# Patient Record
Sex: MALE | Race: WHITE | Employment: FULL TIME | URBAN - METROPOLITAN AREA
[De-identification: names, ages, dates, MRNs, and addresses within clinical notes are randomized per-mention and may not be internally consistent; named-entity substitution may affect disease eponyms.]

---

## 2020-09-23 ENCOUNTER — APPOINTMENT (OUTPATIENT)
Dept: CT IMAGING | Age: 60
End: 2020-09-23
Attending: EMERGENCY MEDICINE
Payer: COMMERCIAL

## 2020-09-23 ENCOUNTER — APPOINTMENT (OUTPATIENT)
Dept: MRI IMAGING | Age: 60
End: 2020-09-23
Attending: EMERGENCY MEDICINE
Payer: COMMERCIAL

## 2020-09-23 ENCOUNTER — HOSPITAL ENCOUNTER (OUTPATIENT)
Age: 60
Setting detail: OBSERVATION
Discharge: HOME | End: 2020-09-23
Attending: EMERGENCY MEDICINE | Admitting: HOSPITALIST
Payer: COMMERCIAL

## 2020-09-23 VITALS
DIASTOLIC BLOOD PRESSURE: 91 MMHG | HEIGHT: 69 IN | TEMPERATURE: 98.1 F | RESPIRATION RATE: 12 BRPM | SYSTOLIC BLOOD PRESSURE: 114 MMHG | BODY MASS INDEX: 21.48 KG/M2 | OXYGEN SATURATION: 96 % | HEART RATE: 53 BPM | WEIGHT: 145 LBS

## 2020-09-23 DIAGNOSIS — G45.9 TIA (TRANSIENT ISCHEMIC ATTACK): Primary | ICD-10-CM

## 2020-09-23 DIAGNOSIS — R03.0 ELEVATED BLOOD PRESSURE READING: ICD-10-CM

## 2020-09-23 LAB
ALBUMIN SERPL-MCNC: 4.3 G/DL (ref 3.4–5)
ALBUMIN/GLOB SERPL: 1.2 {RATIO} (ref 0.8–1.7)
ALP SERPL-CCNC: 85 U/L (ref 45–117)
ALT SERPL-CCNC: 32 U/L (ref 16–61)
AMPHET UR QL SCN: NEGATIVE
ANION GAP SERPL CALC-SCNC: 1 MMOL/L (ref 3–18)
AST SERPL-CCNC: 23 U/L (ref 10–38)
BARBITURATES UR QL SCN: NEGATIVE
BASOPHILS # BLD: 0.1 K/UL (ref 0–0.1)
BASOPHILS NFR BLD: 1 % (ref 0–2)
BENZODIAZ UR QL: NEGATIVE
BILIRUB SERPL-MCNC: 0.6 MG/DL (ref 0.2–1)
BUN SERPL-MCNC: 15 MG/DL (ref 7–18)
BUN/CREAT SERPL: 17 (ref 12–20)
CALCIUM SERPL-MCNC: 9.2 MG/DL (ref 8.5–10.1)
CANNABINOIDS UR QL SCN: NEGATIVE
CHLORIDE SERPL-SCNC: 106 MMOL/L (ref 100–111)
CK MB CFR SERPL CALC: 1.4 % (ref 0–4)
CK MB SERPL-MCNC: 3.3 NG/ML (ref 5–25)
CK SERPL-CCNC: 229 U/L (ref 39–308)
CO2 SERPL-SCNC: 30 MMOL/L (ref 21–32)
COCAINE UR QL SCN: NEGATIVE
CREAT SERPL-MCNC: 0.9 MG/DL (ref 0.6–1.3)
DIFFERENTIAL METHOD BLD: ABNORMAL
EOSINOPHIL # BLD: 0.1 K/UL (ref 0–0.4)
EOSINOPHIL NFR BLD: 1 % (ref 0–5)
ERYTHROCYTE [DISTWIDTH] IN BLOOD BY AUTOMATED COUNT: 14 % (ref 11.6–14.5)
GLOBULIN SER CALC-MCNC: 3.7 G/DL (ref 2–4)
GLUCOSE SERPL-MCNC: 110 MG/DL (ref 74–99)
HCT VFR BLD AUTO: 51.6 % (ref 36–48)
HDSCOM,HDSCOM: NORMAL
HGB BLD-MCNC: 17.7 G/DL (ref 13–16)
INR PPP: 1 (ref 0.8–1.2)
LYMPHOCYTES # BLD: 2.6 K/UL (ref 0.9–3.6)
LYMPHOCYTES NFR BLD: 31 % (ref 21–52)
MCH RBC QN AUTO: 30.4 PG (ref 24–34)
MCHC RBC AUTO-ENTMCNC: 34.3 G/DL (ref 31–37)
MCV RBC AUTO: 88.7 FL (ref 74–97)
METHADONE UR QL: NEGATIVE
MONOCYTES # BLD: 0.7 K/UL (ref 0.05–1.2)
MONOCYTES NFR BLD: 8 % (ref 3–10)
NEUTS SEG # BLD: 5.1 K/UL (ref 1.8–8)
NEUTS SEG NFR BLD: 59 % (ref 40–73)
OPIATES UR QL: NEGATIVE
PCP UR QL: NEGATIVE
PLATELET # BLD AUTO: 239 K/UL (ref 135–420)
PMV BLD AUTO: 10.5 FL (ref 9.2–11.8)
POTASSIUM SERPL-SCNC: 4.2 MMOL/L (ref 3.5–5.5)
PROT SERPL-MCNC: 8 G/DL (ref 6.4–8.2)
PROTHROMBIN TIME: 13.1 SEC (ref 11.5–15.2)
RBC # BLD AUTO: 5.82 M/UL (ref 4.7–5.5)
SODIUM SERPL-SCNC: 137 MMOL/L (ref 136–145)
TROPONIN I SERPL-MCNC: <0.02 NG/ML (ref 0–0.04)
WBC # BLD AUTO: 8.5 K/UL (ref 4.6–13.2)

## 2020-09-23 PROCEDURE — 80053 COMPREHEN METABOLIC PANEL: CPT

## 2020-09-23 PROCEDURE — 99218 HC RM OBSERVATION: CPT

## 2020-09-23 PROCEDURE — 74011250636 HC RX REV CODE- 250/636: Performed by: EMERGENCY MEDICINE

## 2020-09-23 PROCEDURE — 82550 ASSAY OF CK (CPK): CPT

## 2020-09-23 PROCEDURE — 74011000258 HC RX REV CODE- 258: Performed by: EMERGENCY MEDICINE

## 2020-09-23 PROCEDURE — 74011250637 HC RX REV CODE- 250/637: Performed by: EMERGENCY MEDICINE

## 2020-09-23 PROCEDURE — 90686 IIV4 VACC NO PRSV 0.5 ML IM: CPT | Performed by: HOSPITALIST

## 2020-09-23 PROCEDURE — 85025 COMPLETE CBC W/AUTO DIFF WBC: CPT

## 2020-09-23 PROCEDURE — 70496 CT ANGIOGRAPHY HEAD: CPT

## 2020-09-23 PROCEDURE — 77030021352 HC CBL LD SYS DISP COVD -B

## 2020-09-23 PROCEDURE — 80307 DRUG TEST PRSMV CHEM ANLYZR: CPT

## 2020-09-23 PROCEDURE — 70551 MRI BRAIN STEM W/O DYE: CPT

## 2020-09-23 PROCEDURE — 70450 CT HEAD/BRAIN W/O DYE: CPT

## 2020-09-23 PROCEDURE — 99285 EMERGENCY DEPT VISIT HI MDM: CPT

## 2020-09-23 PROCEDURE — 74011250636 HC RX REV CODE- 250/636: Performed by: HOSPITALIST

## 2020-09-23 PROCEDURE — 74011000636 HC RX REV CODE- 636: Performed by: EMERGENCY MEDICINE

## 2020-09-23 PROCEDURE — 65660000000 HC RM CCU STEPDOWN

## 2020-09-23 PROCEDURE — 2709999900 HC NON-CHARGEABLE SUPPLY

## 2020-09-23 PROCEDURE — 85610 PROTHROMBIN TIME: CPT

## 2020-09-23 PROCEDURE — 93005 ELECTROCARDIOGRAM TRACING: CPT

## 2020-09-23 PROCEDURE — 90471 IMMUNIZATION ADMIN: CPT

## 2020-09-23 RX ORDER — ACETAMINOPHEN 650 MG/1
650 SUPPOSITORY RECTAL
Status: DISCONTINUED | OUTPATIENT
Start: 2020-09-23 | End: 2020-09-23 | Stop reason: HOSPADM

## 2020-09-23 RX ORDER — SODIUM CHLORIDE 0.9 % (FLUSH) 0.9 %
5-40 SYRINGE (ML) INJECTION AS NEEDED
Status: DISCONTINUED | OUTPATIENT
Start: 2020-09-23 | End: 2020-09-23 | Stop reason: HOSPADM

## 2020-09-23 RX ORDER — ALPRAZOLAM 0.5 MG/1
1 TABLET ORAL
COMMUNITY

## 2020-09-23 RX ORDER — SODIUM CHLORIDE 9 MG/ML
100 INJECTION, SOLUTION INTRAVENOUS CONTINUOUS
Status: DISCONTINUED | OUTPATIENT
Start: 2020-09-23 | End: 2020-09-23 | Stop reason: HOSPADM

## 2020-09-23 RX ORDER — ONDANSETRON 2 MG/ML
4 INJECTION INTRAMUSCULAR; INTRAVENOUS
Status: DISCONTINUED | OUTPATIENT
Start: 2020-09-23 | End: 2020-09-23 | Stop reason: HOSPADM

## 2020-09-23 RX ORDER — ACETAMINOPHEN 325 MG/1
650 TABLET ORAL
Status: DISCONTINUED | OUTPATIENT
Start: 2020-09-23 | End: 2020-09-23 | Stop reason: HOSPADM

## 2020-09-23 RX ORDER — PROMETHAZINE HYDROCHLORIDE 25 MG/1
12.5 TABLET ORAL
Status: DISCONTINUED | OUTPATIENT
Start: 2020-09-23 | End: 2020-09-23 | Stop reason: HOSPADM

## 2020-09-23 RX ORDER — POLYETHYLENE GLYCOL 3350 17 G/17G
17 POWDER, FOR SOLUTION ORAL DAILY PRN
Status: DISCONTINUED | OUTPATIENT
Start: 2020-09-23 | End: 2020-09-23 | Stop reason: HOSPADM

## 2020-09-23 RX ORDER — GUAIFENESIN 100 MG/5ML
324 LIQUID (ML) ORAL
Status: COMPLETED | OUTPATIENT
Start: 2020-09-23 | End: 2020-09-23

## 2020-09-23 RX ORDER — SODIUM CHLORIDE 9 MG/ML
100 INJECTION, SOLUTION INTRAVENOUS
Status: COMPLETED | OUTPATIENT
Start: 2020-09-23 | End: 2020-09-23

## 2020-09-23 RX ORDER — CLOPIDOGREL BISULFATE 75 MG/1
300 TABLET ORAL
Status: COMPLETED | OUTPATIENT
Start: 2020-09-23 | End: 2020-09-23

## 2020-09-23 RX ORDER — SODIUM CHLORIDE 0.9 % (FLUSH) 0.9 %
5-40 SYRINGE (ML) INJECTION EVERY 8 HOURS
Status: DISCONTINUED | OUTPATIENT
Start: 2020-09-23 | End: 2020-09-23 | Stop reason: HOSPADM

## 2020-09-23 RX ADMIN — SODIUM CHLORIDE 100 ML/HR: 900 INJECTION, SOLUTION INTRAVENOUS at 12:59

## 2020-09-23 RX ADMIN — ASPIRIN 81 MG CHEWABLE TABLET 324 MG: 81 TABLET CHEWABLE at 12:59

## 2020-09-23 RX ADMIN — SODIUM CHLORIDE 100 ML: 900 INJECTION, SOLUTION INTRAVENOUS at 11:17

## 2020-09-23 RX ADMIN — CLOPIDOGREL BISULFATE 300 MG: 75 TABLET ORAL at 12:56

## 2020-09-23 RX ADMIN — Medication 10 ML: at 14:27

## 2020-09-23 RX ADMIN — INFLUENZA VIRUS VACCINE 0.5 ML: 15; 15; 15; 15 SUSPENSION INTRAMUSCULAR at 18:09

## 2020-09-23 RX ADMIN — IOPAMIDOL 80 ML: 755 INJECTION, SOLUTION INTRAVENOUS at 11:16

## 2020-09-23 NOTE — PROGRESS NOTES
Problem: Patient Education: Go to Patient Education Activity  Goal: Patient/Family Education  9/23/2020 1845 by Desiree Toure RN  Outcome: Progressing Towards Goal  9/23/2020 1448 by Desiree Toure RN  Outcome: Progressing Towards Goal     Problem: TIA/CVA Stroke: 0-24 hours  Goal: Off Pathway (Use only if patient is Off Pathway)  9/23/2020 1845 by Desiree Toure RN  Outcome: Progressing Towards Goal  9/23/2020 1448 by Desiree Toure RN  Outcome: Progressing Towards Goal  Goal: Activity/Safety  9/23/2020 1845 by Desiree Toure RN  Outcome: Progressing Towards Goal  9/23/2020 1448 by Desiree Toure RN  Outcome: Progressing Towards Goal  Goal: Consults, if ordered  9/23/2020 1845 by Desiree Toure RN  Outcome: Progressing Towards Goal  9/23/2020 1448 by Desiree Toure RN  Outcome: Progressing Towards Goal  Goal: Diagnostic Test/Procedures  9/23/2020 1845 by Desiree Toure RN  Outcome: Progressing Towards Goal  9/23/2020 1448 by Desiree Toure RN  Outcome: Progressing Towards Goal  Goal: Nutrition/Diet  9/23/2020 1845 by Desiree Toure RN  Outcome: Progressing Towards Goal  9/23/2020 1448 by Desiree Toure RN  Outcome: Progressing Towards Goal  Goal: Discharge Planning  9/23/2020 1845 by Desiree Toure RN  Outcome: Progressing Towards Goal  9/23/2020 1448 by Desiree Toure RN  Outcome: Progressing Towards Goal  Goal: Medications  9/23/2020 1845 by Desiree Toure RN  Outcome: Progressing Towards Goal  9/23/2020 1448 by Desiree Toure RN  Outcome: Progressing Towards Goal  Goal: Respiratory  9/23/2020 1845 by Desiree Toure RN  Outcome: Progressing Towards Goal  9/23/2020 1448 by Desiree Toure RN  Outcome: Progressing Towards Goal  Goal: Treatments/Interventions/Procedures  9/23/2020 1845 by Desiree Toure RN  Outcome: Progressing Towards Goal  9/23/2020 1448 by Annelise Garcia RN  Outcome: Progressing Towards Goal  Goal: Minimize risk of bleeding post-thrombolytic infusion  9/23/2020 1845 by Annelise Garcia RN  Outcome: Progressing Towards Goal  9/23/2020 1448 by Annelise Garcia RN  Outcome: Progressing Towards Goal  Goal: Monitor for complications post-thrombolytic infusion  9/23/2020 1845 by Annelise Garcia RN  Outcome: Progressing Towards Goal  9/23/2020 1448 by Annelise Garcia RN  Outcome: Progressing Towards Goal  Goal: Psychosocial  9/23/2020 1845 by Annelise Garcia RN  Outcome: Progressing Towards Goal  9/23/2020 1448 by Annelise Garcia RN  Outcome: Progressing Towards Goal  Goal: *Hemodynamically stable  9/23/2020 1845 by Annelise Garcia RN  Outcome: Progressing Towards Goal  9/23/2020 1448 by Annelise Garcia RN  Outcome: Progressing Towards Goal  Goal: *Neurologically stable  Description: Absence of additional neurological deficits    9/23/2020 1845 by Annelise Garcia RN  Outcome: Progressing Towards Goal  9/23/2020 1448 by Annelise Garcia RN  Outcome: Progressing Towards Goal  Goal: *Verbalizes anxiety and depression are reduced or absent  9/23/2020 1845 by Annelise Garcia RN  Outcome: Progressing Towards Goal  9/23/2020 1448 by Annelise Garcia RN  Outcome: Progressing Towards Goal  Goal: *Absence of Signs of Aspiration on Current Diet  9/23/2020 1845 by Annelise Garcia RN  Outcome: Progressing Towards Goal  9/23/2020 1448 by Annelise Garcia RN  Outcome: Progressing Towards Goal  Goal: *Absence of deep venous thrombosis signs and symptoms(Stroke Metric)  9/23/2020 1845 by Annelise Garcia RN  Outcome: Progressing Towards Goal  9/23/2020 1448 by Annelise Garcia RN  Outcome: Progressing Towards Goal  Goal: *Ability to perform ADLs and demonstrates progressive mobility and function  9/23/2020 1845 by Jacinta Stacy RN  Outcome: Progressing Towards Goal  9/23/2020 1448 by Jacinta Stacy RN  Outcome: Progressing Towards Goal  Goal: *Stroke education started(Stroke Metric)  9/23/2020 1845 by Jacinta Stacy RN  Outcome: Progressing Towards Goal  9/23/2020 1448 by Jacinta Stacy RN  Outcome: Progressing Towards Goal  Goal: *Dysphagia screen performed(Stroke Metric)  9/23/2020 1845 by Jacinta Stacy RN  Outcome: Progressing Towards Goal  9/23/2020 1448 by Jacinta Stacy RN  Outcome: Progressing Towards Goal  Goal: *Rehab consulted(Stroke Metric)  9/23/2020 1845 by Jacinta Stacy RN  Outcome: Progressing Towards Goal  9/23/2020 1448 by Jacinta Stacy RN  Outcome: Progressing Towards Goal     Problem: TIA/CVA Stroke: Day 2 Until Discharge  Goal: Off Pathway (Use only if patient is Off Pathway)  9/23/2020 1845 by Jacinta Stacy RN  Outcome: Progressing Towards Goal  9/23/2020 1448 by Jacinta Stacy RN  Outcome: Progressing Towards Goal  Goal: Activity/Safety  9/23/2020 1845 by Jacinta Stacy RN  Outcome: Progressing Towards Goal  9/23/2020 1448 by Jacinta Stacy RN  Outcome: Progressing Towards Goal  Goal: Diagnostic Test/Procedures  9/23/2020 1845 by Jacinta Stacy RN  Outcome: Progressing Towards Goal  9/23/2020 1448 by Jacinta Stacy RN  Outcome: Progressing Towards Goal  Goal: Nutrition/Diet  9/23/2020 1845 by Jacinta Stacy RN  Outcome: Progressing Towards Goal  9/23/2020 1448 by Jacinta Stacy RN  Outcome: Progressing Towards Goal  Goal: Discharge Planning  9/23/2020 1845 by Jacinta Stacy RN  Outcome: Progressing Towards Goal  9/23/2020 1448 by Jacinta Stacy RN  Outcome: Progressing Towards Goal  Goal: Medications  9/23/2020 1845 by Jacinta Stacy RN  Outcome: Progressing Towards Goal  9/23/2020 1448 by Jacinta Stacy RN  Outcome: Progressing Towards Goal  Goal: Respiratory  9/23/2020 1845 by Naman Stevens, RN  Outcome: Progressing Towards Goal  9/23/2020 1448 by Naman Stevens, RN  Outcome: Progressing Towards Goal  Goal: Treatments/Interventions/Procedures  9/23/2020 1845 by Naman Stevens, RN  Outcome: Progressing Towards Goal  9/23/2020 1448 by Naman Stevens, RN  Outcome: Progressing Towards Goal  Goal: Psychosocial  9/23/2020 1845 by Naman Stevens, RN  Outcome: Progressing Towards Goal  9/23/2020 1448 by Naman Stevens, RN  Outcome: Progressing Towards Goal  Goal: *Verbalizes anxiety and depression are reduced or absent  9/23/2020 1845 by Naman Stevens, RN  Outcome: Progressing Towards Goal  9/23/2020 1448 by Naman Stevens, RN  Outcome: Progressing Towards Goal  Goal: *Absence of aspiration  9/23/2020 1845 by Naman Stevens, RN  Outcome: Progressing Towards Goal  9/23/2020 1448 by Naman Stevens, RN  Outcome: Progressing Towards Goal  Goal: *Absence of deep venous thrombosis signs and symptoms(Stroke Metric)  9/23/2020 1845 by Naman Stevens, RN  Outcome: Progressing Towards Goal  9/23/2020 1448 by Naman Stevens, RN  Outcome: Progressing Towards Goal  Goal: *Optimal pain control at patient's stated goal  9/23/2020 1845 by Naman Stevens, RN  Outcome: Progressing Towards Goal  9/23/2020 1448 by Naman Stevens, RN  Outcome: Progressing Towards Goal  Goal: *Tolerating diet  9/23/2020 1845 by Naman Stevens, RN  Outcome: Progressing Towards Goal  9/23/2020 1448 by Naman Stevens, RN  Outcome: Progressing Towards Goal  Goal: *Ability to perform ADLs and demonstrates progressive mobility and function  9/23/2020 1845 by Naman Stevens, RN  Outcome: Progressing Towards Goal  9/23/2020 1448 by Naman Stevens, RN  Outcome: Progressing Towards Goal  Goal: *Stroke education continued(Stroke Metric)  9/23/2020 1845 by Stacey Peterson RN  Outcome: Progressing Towards Goal  9/23/2020 1448 by Stacey Peterson RN  Outcome: Progressing Towards Goal     Problem: Ischemic Stroke: Discharge Outcomes  Goal: *Verbalizes anxiety and depression are reduced or absent  9/23/2020 1845 by Stacey Peterson RN  Outcome: Progressing Towards Goal  9/23/2020 1448 by Stacey Peterson RN  Outcome: Progressing Towards Goal  Goal: *Verbalize understanding of risk factor modification(Stroke Metric)  9/23/2020 1845 by Stacey Peterson RN  Outcome: Progressing Towards Goal  9/23/2020 1448 by Stacey Peterson RN  Outcome: Progressing Towards Goal  Goal: *Hemodynamically stable  9/23/2020 1845 by Stacey Peterson RN  Outcome: Progressing Towards Goal  9/23/2020 1448 by Stacey Peterson RN  Outcome: Progressing Towards Goal  Goal: *Absence of aspiration pneumonia  9/23/2020 1845 by Stacey Peterson RN  Outcome: Progressing Towards Goal  9/23/2020 1448 by Stacey Peterson RN  Outcome: Progressing Towards Goal  Goal: *Aware of needed dietary changes  9/23/2020 1845 by Stacey Peterson RN  Outcome: Progressing Towards Goal  9/23/2020 1448 by Stacey Peterson RN  Outcome: Progressing Towards Goal  Goal: *Verbalize understanding of prescribed medications including anti-coagulants, anti-lipid, and/or anti-platelets(Stroke Metric)  9/23/2020 1845 by Stacey Peterson RN  Outcome: Progressing Towards Goal  9/23/2020 1448 by Stacey Peterson RN  Outcome: Progressing Towards Goal  Goal: *Tolerating diet  9/23/2020 1845 by Stacey Peterson RN  Outcome: Progressing Towards Goal  9/23/2020 1448 by Stacey Peterson RN  Outcome: Progressing Towards Goal  Goal: *Aware of follow-up diagnostics related to anticoagulants  9/23/2020 1845 by Stacey Peterson RN  Outcome: Progressing Towards Goal  9/23/2020 1448 by Stacey Peterson RN  Outcome: Progressing Towards Goal  Goal: *Ability to perform ADLs and demonstrates progressive mobility and function  9/23/2020 1845 by Caroline Rick RN  Outcome: Progressing Towards Goal  9/23/2020 1448 by Caroline Rick RN  Outcome: Progressing Towards Goal  Goal: *Absence of DVT(Stroke Metric)  9/23/2020 1845 by Caroline Rick RN  Outcome: Progressing Towards Goal  9/23/2020 1448 by Caroline Rick RN  Outcome: Progressing Towards Goal  Goal: *Absence of aspiration  9/23/2020 1845 by Caroline Rick RN  Outcome: Progressing Towards Goal  9/23/2020 1448 by Caroline Rick RN  Outcome: Progressing Towards Goal  Goal: *Optimal pain control at patient's stated goal  9/23/2020 1845 by Caroline Rick RN  Outcome: Progressing Towards Goal  9/23/2020 1448 by Caroline Rick RN  Outcome: Progressing Towards Goal  Goal: *Home safety concerns addressed  9/23/2020 1845 by Caroline Rick RN  Outcome: Progressing Towards Goal  9/23/2020 1448 by Caroline Rick RN  Outcome: Progressing Towards Goal  Goal: *Describes available resources and support systems  9/23/2020 1845 by Caroline Rick RN  Outcome: Progressing Towards Goal  9/23/2020 1448 by Caroline Rick RN  Outcome: Progressing Towards Goal  Goal: *Verbalizes understanding of activation of EMS(911) for stroke symptoms(Stroke Metric)  9/23/2020 1845 by Caroline Rick RN  Outcome: Progressing Towards Goal  9/23/2020 1448 by Caroline Rick RN  Outcome: Progressing Towards Goal  Goal: *Understands and describes signs and symptoms to report to providers(Stroke Metric)  9/23/2020 1845 by Caroline Rick, RN  Outcome: Progressing Towards Goal  9/23/2020 1448 by Caroline Rick RN  Outcome: Progressing Towards Goal  Goal: *Neurolgocially stable (absence of additional neurological deficits)  9/23/2020 1845 by Caroline Rick RN  Outcome: Progressing Towards Goal  9/23/2020 1448 by Dorian Mcnamara RN  Outcome: Progressing Towards Goal  Goal: *Verbalizes importance of follow-up with primary care physician(Stroke Metric)  9/23/2020 1845 by Dorian Mcnamara RN  Outcome: Progressing Towards Goal  9/23/2020 1448 by Dorian Mcnamara RN  Outcome: Progressing Towards Goal  Goal: *Smoking cessation discussed,if applicable(Stroke Metric)  9/23/2020 1845 by Dorian Mcnamara RN  Outcome: Progressing Towards Goal  9/23/2020 1448 by Dorian Mcnamara RN  Outcome: Progressing Towards Goal  Goal: *Depression screening completed(Stroke Metric)  9/23/2020 1845 by Dorian Mcnamara RN  Outcome: Progressing Towards Goal  9/23/2020 1448 by Dorian Mcnamara RN  Outcome: Progressing Towards Goal     Problem: Pain  Goal: *Control of Pain  9/23/2020 1845 by Dorian Mcnamara RN  Outcome: Progressing Towards Goal  9/23/2020 1448 by Dorian Mcnamara RN  Outcome: Progressing Towards Goal     Problem: Patient Education: Go to Patient Education Activity  Goal: Patient/Family Education  9/23/2020 1845 by Dorian Mcnamara RN  Outcome: Progressing Towards Goal  9/23/2020 1448 by Dorian Mcnamara RN  Outcome: Progressing Towards Goal     Problem: Falls - Risk of  Goal: *Absence of Falls  Description: Document Nicole Fraga Fall Risk and appropriate interventions in the flowsheet.   9/23/2020 1845 by Dorian Mcnamara RN  Outcome: Progressing Towards Goal  Note: Fall Risk Interventions:                             9/23/2020 1448 by Dorian Mcnamara RN  Outcome: Progressing Towards Goal  Note: Fall Risk Interventions:                                Problem: Patient Education: Go to Patient Education Activity  Goal: Patient/Family Education  9/23/2020 1845 by Dorian Mcnamara RN  Outcome: Progressing Towards Goal  9/23/2020 1448 by Dorian Mcnamara RN  Outcome: Progressing Towards Goal

## 2020-09-23 NOTE — PROGRESS NOTES
Discharge/Transition Planning    Problem: Discharge Planning  Goal: *Discharge to safe environment  Outcome: Resolved/Met    Reason for Admission:   Dizziness                   RUR Score:     7%               Plan for utilizing home health:      n/a    PCP: First and Last name:  Parris   Name of Practice:    Are you a current patient: Yes/No: yes   Approximate date of last visit: last week   Can you participate in a virtual visit with your PCP:                     Current Advanced Directive/Advance Care Plan: none. Educated pt                         Transition of Care Plan:          Interviewed patient. Verified demographics listed on face sheet with patient; all information correct. Patient lives in single family with spouse. Patient independent with ADLs prior to admission. DME prior to admission: none . Discharge plan is home      Patient has designated __spouse______________________ to participate in his/her discharge plan and to receive any needed information. Shahbaz Rajput Spouse   [phone]        Care Management Interventions  PCP Verified by CM:  Yes  Mode of Transport at Discharge: Self  Transition of Care Consult (CM Consult): Discharge Planning  Current Support Network: Lives with Spouse  Confirm Follow Up Transport: Self  Discharge Location  Discharge Placement: Home

## 2020-09-23 NOTE — PROGRESS NOTES

## 2020-09-23 NOTE — ED PROVIDER NOTES
EMERGENCY DEPARTMENT HISTORY AND PHYSICAL EXAM    10:46 AM      Date: 9/23/2020  Patient Name: Nelly Medina    History of Presenting Illness     Chief Complaint   Patient presents with    Dizziness    Ear Fullness    Numbness         History Provided By: Patient      Additional History (Context): Nelly Medina is a 61 y.o. male who presents with plaints of lightheadedness, left facial perioral numbness. Patient states his last time normal was approximately 6:30 AM this morning when he complained of acute onset of dizziness he describes as lightheadedness states that intermittently he has difficulty walking with this he came in today concerned because he had some paresthesias, numbness her left side of his mouth. He denies any changes in his speech, vision, strength. Patient states he has had problems with intermittent dizziness times months it has never been associated with any type of numbness or paresthesias previously. Ernesto Davis PCP: John Paul Conway MD      Current Outpatient Medications   Medication Sig Dispense Refill    ALPRAZolam (Xanax) 0.5 mg tablet Take  by mouth. Past History     Past Medical History:  Past Medical History:   Diagnosis Date    Fibula fracture     Fracture of tibial shaft, right, open     Hypertension     Left inguinal hernia        Past Surgical History:  Past Surgical History:   Procedure Laterality Date    HX APPENDECTOMY      HX HERNIA REPAIR      HX HERNIA REPAIR  05/09/2014    HX ORTHOPAEDIC  01/2011    HX VASECTOMY  07/15/16    [facility]        Family History:  Family History   Family history unknown: Yes       Social History:  Social History     Tobacco Use    Smoking status: Current Every Day Smoker     Packs/day: 1.00     Years: 20.00     Pack years: 20.00     Types: Cigarettes    Smokeless tobacco: Never Used   Substance Use Topics    Alcohol use:  Yes     Alcohol/week: 1.0 standard drinks     Types: 1 Standard drinks or equivalent per week    Drug use: No       Allergies:  No Known Allergies      Review of Systems       Review of Systems   Constitutional: Negative for activity change, appetite change, chills, diaphoresis, fatigue and fever. HENT: Negative for congestion. Respiratory: Negative for chest tightness and shortness of breath. Cardiovascular: Negative for chest pain. Gastrointestinal: Negative for abdominal pain, nausea and vomiting. Genitourinary: Negative for flank pain. Musculoskeletal: Positive for gait problem. Negative for back pain and neck pain. Skin: Negative for rash. Neurological: Positive for dizziness, light-headedness and numbness. Negative for syncope, speech difficulty and weakness. Hematological: Does not bruise/bleed easily. All other systems reviewed and are negative. Physical Exam     Visit Vitals  BP (!) 188/112 (BP 1 Location: Right arm, BP Patient Position: At rest)   Pulse 68   Temp 98 °F (36.7 °C)   Resp 16   Ht 5' 9\" (1.753 m)   Wt 65.8 kg (145 lb)   SpO2 99%   BMI 21.41 kg/m²       Physical Exam  Vitals signs and nursing note reviewed. Constitutional:       General: He is not in acute distress. Appearance: He is well-developed. HENT:      Head: Normocephalic and atraumatic. Eyes:      General: No visual field deficit or scleral icterus. Conjunctiva/sclera: Conjunctivae normal.      Pupils: Pupils are equal, round, and reactive to light. Neck:      Musculoskeletal: Normal range of motion and neck supple. Cardiovascular:      Rate and Rhythm: Normal rate and regular rhythm. Heart sounds: Normal heart sounds. No murmur. Pulmonary:      Effort: Pulmonary effort is normal. No respiratory distress. Breath sounds: Normal breath sounds. Abdominal:      General: Bowel sounds are normal. There is no distension. Palpations: Abdomen is soft. Tenderness: There is no abdominal tenderness. Lymphadenopathy:      Cervical: No cervical adenopathy.    Skin:     General: Skin is warm and dry. Findings: No rash. Neurological:      Mental Status: He is alert and oriented to person, place, and time. GCS: GCS eye subscore is 4. GCS verbal subscore is 5. GCS motor subscore is 6. Cranial Nerves: Cranial nerves are intact. No cranial nerve deficit, dysarthria or facial asymmetry. Sensory: Sensation is intact. Motor: Motor function is intact. Coordination: Coordination normal.      Comments: Able to ambulate in the emergency department without difficulty states he feels like his gait is off   Psychiatric:         Behavior: Behavior normal.           Diagnostic Study Results     Labs -  No results found for this or any previous visit (from the past 12 hour(s)). Radiologic Studies -   CT HEAD WO CONT    (Results Pending)   CTA HEAD NECK W WO CONT    (Results Pending)         Medical Decision Making   I am the first provider for this patient. I reviewed the vital signs, available nursing notes, past medical history, past surgical history, family history and social history. Vital Signs-Reviewed the patient's vital signs.       EKG: Rhythm at a rate of 63 with no acute ST-T wave changes QTC is 444 read by EDMD 1143    Records Reviewed: Nursing Notes and Old Medical Records (Time of Review: 10:46 AM)    ED Course: Progress Notes, Reevaluation, and Consults:      Provider Notes (Medical Decision Making):   MDM  Number of Diagnoses or Management Options  Elevated blood pressure reading:   TIA (transient ischemic attack):   Diagnosis management comments: Active sensation of lightheadedness left facial paresthesia with a sensation of difficulty with gait last time normal over 4 hours ago no significant focal deficit on neuro exam will CT CTA    She is hypertensive in the emergency department improved on return from CT CTA, initial CT negative per radiology CTA shows no large vessel occlusion per tele-neurology seen by Dr. Clarice Tran, recommends aspirin, Plavix, further imaging with MRI and admission for TIA.    12:24 PM  Discussed  with Dr. Roque Mir agrees with admission       Amount and/or Complexity of Data Reviewed  Clinical lab tests: ordered and reviewed  Tests in the radiology section of CPT®: ordered and reviewed  Discuss the patient with other providers: yes    Risk of Complications, Morbidity, and/or Mortality  Presenting problems: high  Diagnostic procedures: high  Management options: high            Critical Care Time:     CRITICAL CARE:  12:24 PM  I have spent 40 minutes of critical care time involved in lab review, consultations with specialist, family decision-making, and documentation. During this entire length of time I was immediately available to the patient. Critical Care: The reason for providing this level of medical care for this critically ill patient was due a critical illness that impaired one or more vital organ systems such that there was a high probability of imminent or life threatening deterioration in the patients condition. This care involved high complexity decision making to assess, manipulate, and support vital system functions, to treat this degreee vital organ system failure and to prevent further life threatening deterioration of the patients condition. Diagnosis     Clinical Impression: No diagnosis found. Disposition: admit    Follow-up Information    None          Patient's Medications   Start Taking    No medications on file   Continue Taking    ALPRAZOLAM (XANAX) 0.5 MG TABLET    Take  by mouth. These Medications have changed    No medications on file   Stop Taking    CYCLOBENZAPRINE (FLEXERIL) 10 MG TABLET    10 mg. DIAZEPAM (VALIUM) 10 MG TABLET    Take 1 Tab by mouth as needed (30 minutes prior to your procedure). NAPROXEN (NAPROSYN) 500 MG TABLET    500 mg. OXYCODONE-ACETAMINOPHEN (PERCOCET) 5-325 MG PER TABLET    Take 1-2 Tabs by mouth every six (6) hours as needed for Pain. Max Daily Amount: 8 Tabs. VARENICLINE (CHANTIX STARTER JULIAN) 0.5 MG (11)- 1 MG (42) DSPK    Day 1-3 0.5mg tab qd Day 4-7 0.5mg bid Weeks 2-4 1mg bid     _______________________________    Please note that this dictation was completed with [employer], the computer voice recognition software. Quite often unanticipated grammatical, syntax, homophones, and other interpretive errors are inadvertently transcribed by the computer software. Please disregard these errors. Please excuse any errors that have escaped final proofreading.

## 2020-09-23 NOTE — ED NOTES
TRANSFER - ED to INPATIENT REPORT:    Verbal report given to Cypantionette on Raisa Anderson  being transferred to 2200 for routine progression of care       Report consisted of patients Situation, Background, Assessment and   Recommendations(SBAR). SBAR report made available to receiving floor on this patient being transferred to [address] [place] [address] (2200)  for routine progression of care       Admitting diagnosis TIA (transient ischemic attack) [G45.9]    Information from the following report(s) SBAR, Kardex, MAR, Recent Results and Cardiac Rhythm NSR was made available to receiving floor. Lines:   Peripheral IV 09/23/20 Right Antecubital (Active)   Site Assessment Clean, dry, & intact 09/23/20 1051   Phlebitis Assessment 0 09/23/20 1051   Infiltration Assessment 0 09/23/20 1051   Dressing Status Clean, dry, & intact 09/23/20 1051   Dressing Type Transparent 09/23/20 1051   Hub Color/Line Status Green;Flushed;Patent 09/23/20 1051   Action Taken Blood drawn 09/23/20 1051   Alcohol Cap Used No 09/23/20 1051        Medication list updated today    Opportunity for questions and clarification was provided.       Patient is oriented to time, place, person and situation Last NIH 1  Patient is  continent and ambulatory without assist     Valuables transported with patient     Patient transported with:   Monitor  Registered Nurse    MAP (Monitor): 106 =Monitored (most recent)

## 2020-09-23 NOTE — PROGRESS NOTES
Discussed workup with the patient at the bedside. He very much wants to go home. MRI is non-concerning for TIA. Discharge with outpatient follow up is appropriate.

## 2020-09-23 NOTE — ROUTINE PROCESS
Patient d/c home; flu vaccine administered. 8943: Patient given discharge instruction including a stroke binder. Patient voiced understanding, wheeled off unit via wheelchair, all patient belongings sent home with patient.

## 2020-09-23 NOTE — ED TRIAGE NOTES
Has been having dizziness intermittently since April. Seen by PCP for same. Went to urgent care when woke up fine. Dizziness started , left mouth numbness .  Continues to be dizziness

## 2020-09-23 NOTE — DISCHARGE INSTRUCTIONS
Patient armband removed and shredded  MyChart Activation    Thank you for requesting access to [facility]. Please follow the instructions below to securely access and download your online medical record. [facility] allows you to send messages to your doctor, view your test results, renew your prescriptions, schedule appointments, and more. How Do I Sign Up? 1. In your internet browser, go to [email or web address]  2. Click on the First Time User? Click Here link in the Sign In box. You will be redirect to the New Member Sign Up page. 3. Enter your [facility] Access Code exactly as it appears below. You will not need to use this code after youve completed the sign-up process. If you do not sign up before the expiration date, you must request a new code. [facility] Access Code: [email or web address]  Expires: 2020 10:46 AM (This is the date your [facility] access code will )    4. Enter the last four digits of your Social Security Number (xxxx) and Date of Birth (mm/dd/yyyy) as indicated and click Submit. You will be taken to the next sign-up page. 5. Create a [facility] ID. This will be your [facility] login ID and cannot be changed, so think of one that is secure and easy to remember. 6. Create a [facility] password. You can change your password at any time. 7. Enter your Password Reset Question and Answer. This can be used at a later time if you forget your password. 8. Enter your e-mail address. You will receive e-mail notification when new information is available in [address]. 9. Click Sign Up. You can now view and download portions of your medical record. 10. Click the Download Summary menu link to download a portable copy of your medical information. Additional Information    If you have questions, please visit the Frequently Asked Questions section of the [facility] website at [email or web address] [email or web address] Remember, [facility] is NOT to be used for urgent needs.  For medical emergencies, dial 911.      PATIENT DISCHARGE INSTRUCTIONS      PATIENT DISCHARGE INSTRUCTIONS    Mackenzie Winter / [id number] : 1960    Admitted 2020 Discharged: 2020       · It is important that you take the medication exactly as they are prescribed. · Keep your medication in the bottles provided by the pharmacist and keep a list of the medication names, dosages, and times to be taken in your wallet. · Do not take other medications without consulting your doctor. What to do at Home    Recommended Diet: Cardiac Diet    Recommended Activity: Activity as tolerated    If you experience any of the following symptoms light headed or dizziness, please follow up with your primary care physician or nearest emergency department  Patient Education        Elevated Blood Pressure: Care Instructions  Your Care Instructions    Blood pressure is a measure of how hard the blood pushes against the walls of your arteries. It's normal for blood pressure to go up and down throughout the day. But if it stays up over time, you have high blood pressure. Two numbers tell you your blood pressure. The first number is the systolic pressure. It shows how hard the blood pushes when your heart is pumping. The second number is the diastolic pressure. It shows how hard the blood pushes between heartbeats, when your heart is relaxed and filling with blood. An ideal blood pressure in adults is less than 120/80 (say \"120 over 80\"). High blood pressure is 140/90 or higher. You have high blood pressure if your top number is 140 or higher or your bottom number is 90 or higher, or both. The main test for high blood pressure is simple, fast, and painless. To diagnose high blood pressure, your doctor will test your blood pressure at different times. After testing your blood pressure, your doctor may ask you to test it again when you are home.   If you are diagnosed with high blood pressure, you can work with your doctor to make a long-term plan to manage it. Follow-up care is a key part of your treatment and safety. Be sure to make and go to all appointments, and call your doctor if you are having problems. It's also a good idea to know your test results and keep a list of the medicines you take. How can you care for yourself at home? · Do not smoke. Smoking increases your risk for heart attack and stroke. If you need help quitting, talk to your doctor about stop-smoking programs and medicines. These can increase your chances of quitting for good. · Stay at a healthy weight. · Try to limit how much sodium you eat to less than 2,300 milligrams (mg) a day. Your doctor may ask you to try to eat less than 1,500 mg a day. · Be physically active. Get at least 30 minutes of exercise on most days of the week. Walking is a good choice. You also may want to do other activities, such as running, swimming, cycling, or playing tennis or team sports. · Avoid or limit alcohol. Talk to your doctor about whether you can drink any alcohol. · Eat plenty of fruits, vegetables, and low-fat dairy products. Eat less saturated and total fats. · Learn how to check your blood pressure at home. When should you call for help? Call your doctor now or seek immediate medical care if:  ? · Your blood pressure is much higher than normal (such as 180/110 or higher). ? · You think high blood pressure is causing symptoms such as:  ¨ Severe headache. ¨ Blurry vision. ? Watch closely for changes in your health, and be sure to contact your doctor if:  ? · You do not get better as expected. Where can you learn more? Go to http://www.gray.com/. Enter V544 in the search box to learn more about \"Elevated Blood Pressure: Care Instructions. \"  Current as of: September 21, 2016  Content Version: 11.4  © 3341-7573 Healthwise, [employer].  Care instructions adapted under license by [employer] (which disclaims liability or warranty for this information). If you have questions about a medical condition or this instruction, always ask your healthcare professional. [address]  any warranty or liability for your use of this information. Mary Franz

## 2020-09-23 NOTE — H&P
History and Physical    Patient: Onesimo Peralta               Sex: male          DOA: 2020       YOB: 1960      Age:  61 y.o.        LOS:  LOS: 0 days        HPI:     Onesimo Peralta is a 61 y.o. male who presented to the ER with dizziness and light headedness. This has been an issue for him for several months. This morning it was so bad that he had to lay down on his front porch. He did not have facial droop or slurred speech. He did not have muscle weakness. He did not have seizure of loss of consciousness. He presented to the ER where there was concern that he could have TIA or CVA. Tele neurology recommended admission with TIA workup. Past Medical History:   Diagnosis Date    Fibula fracture     Fracture of tibial shaft, right, open     Hypertension     Left inguinal hernia        Social History:   Tobacco use:  Patient has smoked for 40+ years   Alcohol use:  Patient uses alcohol daily, he drinks wine   Occupation:  Patient is a     Family History: Mother  with CVA and DM   Father , he had prostate cancer but was otherwise healthy    Review of Systems    Constitutional:  No fever or weight loss  HEENT:  No headache or visual changes  Cardiovascular:  No chest pain or diaphoresis  Respiratory:  No coughing, wheezing, or shortness of breath. GI:  No nausea or vomitting. No diarrhea  :  No hematuria or dysuria  Skin:  No rashes or moles  Neuro:  Light headedness and dizziness as above.  no seizures or syncope  Hematological:  No bruising or bleeding  Endocrine:  No diabetes or thyroid disease    Physical Exam:      Visit Vitals  BP (!) 114/91 (BP 1 Location: Right arm, BP Patient Position: At rest)   Pulse (!) 53   Temp 98.1 °F (36.7 °C)   Resp 12   Ht 5' 9\" (1.753 m)   Wt 65.8 kg (145 lb)   SpO2 96%   BMI 21.41 kg/m²       Physical Exam:    Gen:  No distress, alert  HEENT:  Normal cephalic atraumatic, extra-occular movements are intact. Neck:  Supple, No JVD  Lungs:  Clear bilaterally, no wheeze, no rales, normal effort  Heart:  Regular Rate and Rhythm, normal S1 and S2, no edema  Abdomen:  Soft, non tender, normal bowel sounds, no guarding. Extremities:  Well perfused, no cyanosis or edema  Neurological:  Awake and alert, CN's are intact, normal strength throughout extremities  Skin:  No rashes or moles  Mental Status:  Normal thought process, does not appear anxious    Laboratory Studies: All lab results for the last 24 hours reviewed. Assessment/Plan     Active Problems:    TIA (transient ischemic attack) (9/23/2020)    HTN    PLAN:    MRI of brain  BP control  Follow Neurological status. General

## 2020-09-23 NOTE — PROGRESS NOTES
completed the initial Spiritual Assessment of the patient in bed 4 of the emergency room, and offered Pastoral Care support to the patient. Patient does not have any Scientologist/cultural needs that will affect patients preferences in health care. Chaplains will continue to follow and will provide pastoral care on an as needed/requested basis.     Mariana Salgado  [facility] Care Department  [phone]

## 2020-09-24 LAB
ATRIAL RATE: 63 BPM
CALCULATED P AXIS, ECG09: 64 DEGREES
CALCULATED R AXIS, ECG10: 65 DEGREES
CALCULATED T AXIS, ECG11: 67 DEGREES
DIAGNOSIS, 93000: NORMAL
P-R INTERVAL, ECG05: 120 MS
Q-T INTERVAL, ECG07: 434 MS
QRS DURATION, ECG06: 90 MS
QTC CALCULATION (BEZET), ECG08: 444 MS
VENTRICULAR RATE, ECG03: 63 BPM

## 2020-09-30 NOTE — DISCHARGE SUMMARY
[address]    Name:  Alicia Jorge  MR#:   [id number]  :  1960  ACCOUNT #:  [de-identified]  ADMIT DATE:  2020  DISCHARGE DATE:  2020    ADMITTING DIAGNOSIS:  Transient ischemic attack. HISTORY OF PRESENT ILLNESS:  The patient is a 80-year-old male who presented to the emergency room with lightheadedness and dizziness. This has been worsening for him for several months prior to admission. It became so bad on the morning of admission that he had to lay down on his porch. He did not have facial droop or slurred speech. He did not have muscle weakness. In the emergency room, there was concern that this could represent TIA. Teleneurology recommended inpatient workup. HOSPITAL COURSE:  The patient went on to have MRI testing which did not demonstrate stroke. His symptoms had significantly improved. He did acknowledge that he has had a significant utilization of caffeine and nicotine prior to these escalation of symptoms. We discussed further management. He opted for discharging home and will see his doctor as an outpatient for further management. By 2020, he was considered to be stable for discharge. He was discharged home. DIAGNOSES:  Lightheadedness and dizziness, transient ischemic attack ruled out. CONDITION ON DISCHARGE:  His condition is improved. ACTIVITY:  Ad suyapa. DISCHARGE INSTRUCTIONS:  Followup is with his primary care provider in 1 week. The patient will arrange. DISCHARGE MEDICATIONS:  Xanax 0.5 mg by home regimen. DIET:  Regular. DISPOSITION:  Home.     Total Discharge time 35 minutes      Mookie Martinez MD      [id number]  D:  2020 10:38  T:  2020 18:25  JOB #:  [id number]

## 2020-10-08 NOTE — ADT AUTH CERT NOTES
PREVIOUSLY DENIED FOR INPATIENT DOWNGRADED TO OBSERVATION REF # [id number] PLEASE FAX OR CALL BACK TO NOTIFY IF  AUTHORIZATION FOR OBSERVATION IS OR IS NOT REQUIRED  PHONE # [phone]  FAX # [phone]